# Patient Record
(demographics unavailable — no encounter records)

---

## 2024-10-21 NOTE — PHYSICAL EXAM
[Chaperone Present] : A chaperone was present in the examining room during all aspects of the physical examination [82034] : A chaperone was present during the pelvic exam. [FreeTextEntry2] : Cherelle [Appropriately responsive] : appropriately responsive [Alert] : alert [No Acute Distress] : no acute distress [No Lymphadenopathy] : no lymphadenopathy [Regular Rate Rhythm] : regular rate rhythm [No Murmurs] : no murmurs [Clear to Auscultation B/L] : clear to auscultation bilaterally [Soft] : soft [Non-tender] : non-tender [Non-distended] : non-distended [No HSM] : No HSM [No Lesions] : no lesions [No Mass] : no mass [Oriented x3] : oriented x3 [FreeTextEntry6] : No masses, nontender, no skin changes, no nipple discharge, no adenopathy. [Examination Of The Breasts] : a normal appearance [No Masses] : no breast masses were palpable [Labia Majora] : normal [Labia Minora] : normal [Normal] : normal [Tenderness] : nontender [Anteversion] : anteverted [Mass ___ cm] : no uterine mass was palpated [Uterine Adnexae] : normal

## 2024-10-21 NOTE — HISTORY OF PRESENT ILLNESS
[FreeTextEntry1] : Patient is a 51-year-old  1 para 1 last menstrual period 2024 Patient presents for a visit,, complaining of hot flashes, night sweats, muscle aches joint aches products, alopecia with irregular cycle with the previous one from August back in 2023

## 2024-10-21 NOTE — PLAN
[FreeTextEntry1] : Patient is a 51-year-old  1 para 1 last menstrual period  and prior to that was 2023 Patient presents for annual visit,, complaining of night sweats, muscle aches joint aches body aches vaginal dryness, Physical exam reveals a well-developed well-nourished female no apparent distress,, BMI 30 Heart regular rhythm and rate, lungs clear, breast no mass nontender no skin change or nipple discharge no adenopathy, abdomen soft nontender no organomegaly Pelvic exam shows normal female external genitalia, vagina no lesions, cervix appropriate size nontender, uterus anteverted normal size nontender, adnexa no mass nontender Biopsy was performed Patient will provide a copy of a negative mammogram performed in July of this year and will have the results emailed to the office and will be printed out and entered in her chart Essentially benign GYN exam Patient states that she will be scheduled colonoscopy Follow-up 1 year or prior to that as needed  Cherelle was present as a chaperone for the entire assessment and examination of this patient